# Patient Record
(demographics unavailable — no encounter records)

---

## 2025-03-03 NOTE — ASSESSMENT
[Treatment Education] : treatment education [Treatment Adherence] : treatment adherence [Sexuality / Safer Sex] : sexuality/safer sex [FreeTextEntry1] : plan --chronic stable HIV and chronic stable vit D deficiency needs GI screening for family Hx of colon ca 1) Continue Biktarvy -25mg oral tablet daily daily and Vit D 97923 once weekly oral tablet ( reviewed and renewed ) 2) see in person or telehealth in 6 months and all previous labs reviewed labs today and new labs today including CBC, CMP, A1C , G & C oral rectal and urine, Urine, Tsh , syphilis . 3) GI for screening colonoscopy due to family history 4) external provider notes reviewed 5) right knee pain x 1 month---referral to sports medicine

## 2025-03-03 NOTE — HISTORY OF PRESENT ILLNESS
[FreeTextEntry1] : Reason For Visit---chronic stable HIV and chronic stable vit D deficiency Feliberto Weiss is a pleasant 32 y/o MSM male patient in for follow up visit. He is currently taking Biktarvy with no missed doses, he is doing well on it. He give no complaints. Non drinker, social smoker. 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 and concern for colon cancer , fathers brother had colon cancer EKG was abnormal with ST elevation, he was seen in the ER for further evaluation and needs follow up with cardiology--was just seen by Dr. Ramo Vang in cardiology  plan --chronic stable HIV and chronic stable vit D deficiency needs GI screening for family Hx of colon ca 1) Continue Biktarvy -25mg oral tablet daily daily and Vit D 01776 once weekly oral tablet ( reviewed and renewed ) 2) see in person or telehealth in 6 months and all previous labs reviewed labs today and new labs today including CBC, CMP, A1C , G & C oral rectal and urine, Urine, Tsh , syphilis . 3) GI for screening colonoscopy due to family history 4) external provider notes reviewed 5) right knee pain x 1 month---referral to sports medicine   HIV, seasonal allergies, h/o vit d insufficiency. Diagnosed with HIV 7/2016 and was on Triumeq. Takes every morning at 8am with fruit or cereal. Plan to change Triumeq to Biktarvy...see in a 2 month...labs today. still taking singulair PRN, states only as needed. still on vit D weekly however often forgets. Also gets charged a lot by insurance. Wants to switch to daily. Pt and his main partner Arnel are considering having a more open relationship, pt is verse bottom, partner is only top.  STI, Dates, Treatment: 9/2017 gonorrhea treated at [facility] Occupation: working at [employer] group home in [place] since 4/2018   Current Meds Biktarvy -25 MG Oral Tablet; TAKE ONE TABLET BY MOUTH DAILY Montelukast Sodium 10 MG Oral Tablet; TAKE 1 TABLET AT BEDTIME Vitamin D (Ergocalciferol) 1.25 MG (37959 UT) Oral Capsule; TAKE 1 CAPSULE WEEKLY  Allergies No Known Allergies

## 2025-03-10 NOTE — PHYSICAL EXAM
[de-identified] : Constitutional:  [    ], alert and oriented, cooperative, in no acute distress.  HEENT  NC/AT.  Appearance: symmetric  Neck/Back Straight without deformity or instability.  Good ROM.  Chest/Respiratory  Respiratory effort: no intercostal retractions or use of accessory muscles. Nonlabored Breathing  Skin  On inspection, warm and dry without rashes or lesions.  Mental Status:  Judgment, insight: intact Orientation: oriented to time, place, and person  Neurological: Sensory and Motor are grossly intact throughout  Right Knee  Inspection:     Skin intact, no rashes or lesions     No Effusion     Non-tender to palpation over tibial tubercle, patella, medial and lateral joint line, and pes insertion.  Range of Motion: 	Extension - [     ] degrees 	Flexion - [     ] degrees 	Alignment - [     ] degrees 	Extensor lag: None  Stability:      Demonstrates no Varus or Valgus instability      Negative Anterior or Posterior drawer.      Negative Lachman's      Negative McMurrays  Patella: stable, tracks well.   Neurologic Exam     Motor intact including 5/5 Extensor Hallucis Longus, 5/5 Flexor Hallucis Longus, 5/5 Tibialis Anterior and 5/5 Gastrocnemius     Sensation Intact to Light Touch including Saphenous, Sural, Superficial Peroneal, Deep Peroneal, Tibial nerve distributions  Vascular Exam     Foot is warm and well perfused with 2+ Dorsalis Pedis Pulse   No pain with range of motion of the bilateral hips or left knee. No lumbar paraspinal muscle tenderness.

## 2025-03-11 NOTE — DISCUSSION/SUMMARY
[FreeTextEntry1] : In summary  pleasant, 32 year old with a Past FHx Mother CHF (Longtime Smoker), Father Passed of Overdose, Past medical history of HIV On Biktarvy, Abnormal ECG =============== TTE --  - LVEF  Nl ===============  HIV On Biktarvy, Abnormal ECG - Diet/Exercise discussed - Re-assurance  Mn Systolic Murmur (holosystolic) I.VI apical c/f MR & BACH New - TTE                    Cholesterol management - Assessed - Impression is active; changes as per above - Discussed diet and exercise at length - Any lifestyle/medication changes as per above Risk factors for cardiomyopathy - Assessed - Impression is stable - Reviewed records from PCP BP - Assessed - Impression is active Cardiac Health optimization - Discussed diet and exercise at length - Discussed importance of monitoring and re-assessment of cardiac health on further visits       Deangelo, it was a pleasure to participate in the care of your patient.   With kind thanks for the referral.  Ramo Vang MD [facility] GALLO SALAZAR Director, Preventive Cardiology & Lipidology [facility]                                                                                                                                                                                                                                                                                                                                                                             ----------- 23 minutes was provided for preventive counseling on healthy diet, weight maintenance, CV risk reduction. Specifically, and separate from other cardiovascular evaluation and treatment, we discussed h willingness to focus  on lifestyle changes, particularly dietary; including greater consumption of vegetables, fruits over saturated fats. We discussed appropriate follow up to monitor progress on these areas.     -

## 2025-03-11 NOTE — DISCUSSION/SUMMARY
[FreeTextEntry1] : In summary  pleasant, 32 year old with a Past FHx Mother CHF (Longtime Smoker), Father Passed of Overdose, Past medical history of HIV On Biktarvy, Abnormal ECG =============== TTE --  - LVEF  Nl ===============  HIV On Biktarvy, Abnormal ECG - Diet/Exercise discussed - Re-assurance  Mn Systolic Murmur (holosystolic) I.VI apical c/f MR & BACH New - TTE                    Cholesterol management - Assessed - Impression is active; changes as per above - Discussed diet and exercise at length - Any lifestyle/medication changes as per above Risk factors for cardiomyopathy - Assessed - Impression is stable - Reviewed records from PCP BP - Assessed - Impression is active Cardiac Health optimization - Discussed diet and exercise at length - Discussed importance of monitoring and re-assessment of cardiac health on further visits       Deangelo, it was a pleasure to participate in the care of your patient.   With kind thanks for the referral.  Ramo Vang MD [facility] GALLO SALAZAR Director, Preventive Cardiology & Lipidology [facility]                                                                                                                                                                                                                                                                                                                                                                             ----------- 23 minutes was provided for preventive counseling on healthy diet, weight maintenance, CV risk reduction. Specifically, and separate from other cardiovascular evaluation and treatment, we discussed h willingness to focus  on lifestyle changes, particularly dietary; including greater consumption of vegetables, fruits over saturated fats. We discussed appropriate follow up to monitor progress on these areas.     -

## 2025-03-11 NOTE — HISTORY OF PRESENT ILLNESS
[FreeTextEntry1] : Dear Deangelo,  I had the pleasure of seeing your patient CORTNEY VAZQUEZ for CV/cardiometabolic evaluation.  As you know, he  is a pleasant, 32 year old with a Past FHx Mother CHF (Longtime Smoker), Father Passed of Overdose, Past medical history of HIV On Biktarvy, Abnormal ECG =============== ===============  HIV On Biktarvy, Abnormal ECG - Mn BACH -> Check TTE ---------------------------- 3-2024 TTE --  - LVEF  Nl ----------------- ----------------- ----------------- ----------------- 3-2025 CC: Heart Issues - Patient reports no chest pain, no localization to the sternum, no radiation to the neck/jaw, no alleviating nor worsening precipitants to CP, no assoc symptoms to CP no alleviating nor worsening precipitants to CP, no assoc symptoms to CP - Patient notes no associated SOB/Palps/Leg swelling - Reports No associated F/C/N/V/Headaches - Reports Normal Exercise Tolerance - Reports no medication changes - Reports normal mood/quality of life - Reports no associated midnight awakenings from cP - Reports no diet changes - Reports no associated body aches - Reports no recent colds/viruses - Recent labs/imaging reviewed - Relevant Family history reviewed Mother/Father - CV Risk Assessment for 10 Year ACC/AHA Pooled Risk Cohort Equation places this person at < 7.5% Risk of ASCVD TTE --  - LVEF  Nl Mn Systolic Murmur (holosystolic) I.VI apical c/f MR BACH - TTE

## 2025-03-11 NOTE — PHYSICAL EXAM

## 2025-03-11 NOTE — PHYSICAL EXAM
